# Patient Record
Sex: FEMALE | Race: WHITE | URBAN - METROPOLITAN AREA
[De-identification: names, ages, dates, MRNs, and addresses within clinical notes are randomized per-mention and may not be internally consistent; named-entity substitution may affect disease eponyms.]

---

## 2018-04-19 ENCOUNTER — HOSPITAL ENCOUNTER (OUTPATIENT)
Dept: OCCUPATIONAL THERAPY | Facility: HOSPITAL | Age: 23
End: 2018-04-21
Attending: SURGERY | Admitting: SURGERY

## 2018-04-19 LAB
APPEARANCE, UA: CLEAR
BACTERIA SPEC CULT: ABNORMAL /HPF
BILIRUB UR QL STRIP: NEGATIVE
COLOR UR: YELLOW
GLUCOSE (UA): NEGATIVE
HGB UR QL STRIP: NEGATIVE
KETONES UR QL STRIP: ABNORMAL
LEUKOCYTE ESTERASE UR QL STRIP: NEGATIVE
NITRITE UR QL STRIP: NEGATIVE
PH UR STRIP: 6 [PH] (ref 5–9)
PROT UR QL STRIP: NEGATIVE
RBC #/AREA URNS HPF: ABNORMAL /HPF
SP GR UR STRIP: >1.04 (ref 1–1.03)
SQUAMOUS EPITHELIAL, UA: ABNORMAL
UROBILINOGEN UR STRIP-ACNC: 1
WBC #/AREA URNS HPF: ABNORMAL /HPF

## 2018-04-20 LAB
ABS NEUT (OLG): 17.04 X10(3)/MCL (ref 2.1–9.2)
ALBUMIN SERPL-MCNC: 3 GM/DL (ref 3.4–5)
ALBUMIN/GLOB SERPL: 1 RATIO (ref 1.1–2)
ALP SERPL-CCNC: 59 UNIT/L (ref 38–126)
ALT SERPL-CCNC: 16 UNIT/L (ref 12–78)
AST SERPL-CCNC: 11 UNIT/L (ref 15–37)
BASOPHILS # BLD AUTO: 0 X10(3)/MCL (ref 0–0.2)
BASOPHILS NFR BLD AUTO: 0 %
BILIRUB SERPL-MCNC: 0.7 MG/DL (ref 0.2–1)
BILIRUBIN DIRECT+TOT PNL SERPL-MCNC: 0.2 MG/DL (ref 0–0.5)
BILIRUBIN DIRECT+TOT PNL SERPL-MCNC: 0.5 MG/DL (ref 0–0.8)
BUN SERPL-MCNC: 6 MG/DL (ref 7–18)
CALCIUM SERPL-MCNC: 7.7 MG/DL (ref 8.5–10.1)
CHLORIDE SERPL-SCNC: 106 MMOL/L (ref 98–107)
CO2 SERPL-SCNC: 24 MMOL/L (ref 21–32)
CREAT SERPL-MCNC: 0.53 MG/DL (ref 0.55–1.02)
ERYTHROCYTE [DISTWIDTH] IN BLOOD BY AUTOMATED COUNT: 11.9 % (ref 11.5–17)
GLOBULIN SER-MCNC: 2.9 GM/DL (ref 2.4–3.5)
GLUCOSE SERPL-MCNC: 90 MG/DL (ref 74–106)
HCT VFR BLD AUTO: 32.1 % (ref 37–47)
HGB BLD-MCNC: 10.9 GM/DL (ref 12–16)
LYMPHOCYTES # BLD AUTO: 1.7 X10(3)/MCL (ref 0.6–4.6)
LYMPHOCYTES NFR BLD AUTO: 9 %
MCH RBC QN AUTO: 31.4 PG (ref 27–31)
MCHC RBC AUTO-ENTMCNC: 34 GM/DL (ref 33–36)
MCV RBC AUTO: 92.5 FL (ref 80–94)
MONOCYTES # BLD AUTO: 0.7 X10(3)/MCL (ref 0.1–1.3)
MONOCYTES NFR BLD AUTO: 3 %
NEUTROPHILS # BLD AUTO: 17.04 X10(3)/MCL (ref 1.4–7.9)
NEUTROPHILS NFR BLD AUTO: 87 %
PLATELET # BLD AUTO: 183 X10(3)/MCL (ref 130–400)
PMV BLD AUTO: 9.9 FL (ref 9.4–12.4)
POTASSIUM SERPL-SCNC: 3.7 MMOL/L (ref 3.5–5.1)
PROT SERPL-MCNC: 5.9 GM/DL (ref 6.4–8.2)
RBC # BLD AUTO: 3.47 X10(6)/MCL (ref 4.2–5.4)
SODIUM SERPL-SCNC: 138 MMOL/L (ref 136–145)
WBC # SPEC AUTO: 19.5 X10(3)/MCL (ref 4.5–11.5)

## 2018-04-21 LAB
ABS NEUT (OLG): 12.72 X10(3)/MCL (ref 2.1–9.2)
BASOPHILS # BLD AUTO: 0 X10(3)/MCL (ref 0–0.2)
BASOPHILS NFR BLD AUTO: 0 %
EOSINOPHIL # BLD AUTO: 0 X10(3)/MCL (ref 0–0.9)
EOSINOPHIL NFR BLD AUTO: 0 %
ERYTHROCYTE [DISTWIDTH] IN BLOOD BY AUTOMATED COUNT: 12 % (ref 11.5–17)
HCT VFR BLD AUTO: 31.5 % (ref 37–47)
HGB BLD-MCNC: 10.7 GM/DL (ref 12–16)
LYMPHOCYTES # BLD AUTO: 0.8 X10(3)/MCL (ref 0.6–4.6)
LYMPHOCYTES NFR BLD AUTO: 6 %
MCH RBC QN AUTO: 30.4 PG (ref 27–31)
MCHC RBC AUTO-ENTMCNC: 34 GM/DL (ref 33–36)
MCV RBC AUTO: 89.5 FL (ref 80–94)
MONOCYTES # BLD AUTO: 0.8 X10(3)/MCL (ref 0.1–1.3)
MONOCYTES NFR BLD AUTO: 6 %
NEUTROPHILS # BLD AUTO: 12.72 X10(3)/MCL (ref 2.1–9.2)
NEUTROPHILS NFR BLD AUTO: 88 %
PLATELET # BLD AUTO: 207 X10(3)/MCL (ref 130–400)
PMV BLD AUTO: 9.9 FL (ref 9.4–12.4)
RBC # BLD AUTO: 3.52 X10(6)/MCL (ref 4.2–5.4)
WBC # SPEC AUTO: 14.5 X10(3)/MCL (ref 4.5–11.5)

## 2020-11-03 PROBLEM — S02.2XXA CLOSED FRACTURE OF NASAL BONES: Status: ACTIVE | Noted: 2020-11-03

## 2022-04-30 NOTE — H&P
Patient:   SHARON BREWER             MRN: [id number]            FIN: [id number]               Age:   22 years     Sex:  Female     :  1995   Associated Diagnoses:   None   Author:   Winston Villalpando      Basic Information   Admit information:  Abdominal pain and fever .    Source of history:  Self, Family member, Medical record.    Present at bedside:  Family member, Medical personnel.    Referral source:  Emergency department.    History limitation:  None.       Chief Complaint       2018 20:07 CDT      pt c/o R lower pelvic pain, seen at [facility] dx w/ uti today, states when she got home had vaginal bleeding, fever, and vomiting  x 1, lmp ended . had neg pregnancy test today.  in triage.    2018 13:57 CDT      pt reports to the ed c/o lower abdominal pain (stabbing/pressure) with chills since yesterday....        History of Present Illness   Very pleasant 22-year-old white female who presents to the emergency department after a 24-hour history of lower midline and right lower quadrant abdominal pain.  Seen earlier today at [facility] clinics diagnosed with urinary tract infection.  Post discharge the patient's fever spike to 103.1 and the patient began vomiting.  She returned to our emergency department.  An in and out catheter urinalysis revealed no urinary tract infection.  Subsequent CAT scan revealed fluid-filled loops of bowel which could represent gastroenteritis and an appendix that was not visualized.  The patient reports that she did begin having vaginal bleeding although her period ended .  The pain is worsened by palpation.  It is constant.  It is not relieved by anything.  She's been afebrile since arrival.  She has a leukocytosis of 16.6 otherwise her laboratory results are within normal limit.  She has no past surgical and medical history.  She has no known drug allergies.  She is currently comfortable although in some pain, with her  family at the bedside.  Hemodynamically she is stable.      Review of Systems   Constitutional:  Fever, Decreased activity.    Respiratory:  Negative.    Cardiovascular:  Negative.    Gastrointestinal:  Nausea, Vomiting, No diarrhea.         Abdominal pain: Right, Lower quadrant, Middle, The severity is moderate, Characterized as ( Continuous ).    Genitourinary:  Vaginal bleeding.    Hematology/Lymphatics:  Negative.    Endocrine:  Negative.    Immunologic:  Negative.    Musculoskeletal:  Negative.    Integumentary:  Negative.    Neurologic:  Negative.    Psychiatric:  Negative.       Health Status   Allergies:    Allergic Reactions (Selected)  No Known Allergies   Problem list:    All Problems  Tobacco user / SNOMED CT [id number] / Confirmed      Histories   Past Medical History: Negative   Procedure history: Negative   Social History        Social & Psychosocial Habits    Tobacco  04/19/2018  Use: Never smoker  .        Physical Examination   Vital Signs   4/19/2018 23:35 CDT      Peripheral Pulse Rate     119 bpm  HI                             Respiratory Rate          20 br/min                             SpO2                      98 %                             Oxygen Therapy            Room air                             Systolic Blood Pressure   121 mmHg                             Diastolic Blood Pressure  79 mmHg    4/19/2018 21:43 CDT      Peripheral Pulse Rate     125 bpm  HI                             Respiratory Rate          18 br/min                             SpO2                      99 %                             Oxygen Therapy            Room air                             Systolic Blood Pressure   124 mmHg                             Diastolic Blood Pressure  82 mmHg                             Mean Arterial Pressure, Cuff              96 mmHg    4/19/2018 20:07 CDT      Temperature Oral          37.3 DegC                             Temperature Oral (calculated)             99.14 DegF                              Peripheral Pulse Rate     143 bpm  HI                             Respiratory Rate          20 br/min                             SpO2                      96 %                             Oxygen Therapy            Room air                             Systolic Blood Pressure   136 mmHg                             Diastolic Blood Pressure  99 mmHg  HI    4/19/2018 16:36 CDT      Peripheral Pulse Rate     100 bpm                             Systolic Blood Pressure   110 mmHg                             Diastolic Blood Pressure  70 mmHg                             Mean Arterial Pressure, Cuff              83 mmHg    4/19/2018 13:57 CDT      Temperature Oral          36.9 DegC                             Temperature Oral (calculated)             98.42 DegF                             Peripheral Pulse Rate     128 bpm  HI                             Respiratory Rate          18 br/min                             SpO2                      100 %                             Oxygen Therapy            Room air                             Systolic Blood Pressure   123 mmHg                             Diastolic Blood Pressure  78 mmHg     Measurements from flowsheet : Measurements   4/19/2018 20:07 CDT      Weight Dosing             59 kg                             Weight Measured and Calculated in Lbs     130.07 lb                             Weight Estimated          59 kg                             Height/Length Dosing      152 cm                             Height/Length Estimated   152 cm                             Body Mass Index Estimated 25.54 kg/m2    4/19/2018 13:57 CDT      Weight Dosing             59.05 kg                             Weight Measured           59.05 kg                             Weight Measured and Calculated in Lbs     130.18 lb                             Height/Length Dosing      152.40 cm                             Height/Length Measured    152.40 cm                              Body Mass Index Measured  25.42 kg/m2        Review / Management   Results review:     No qualifying data available.    Laboratory Results   Today's Lab Results : PowerNote Discrete Results   4/19/2018 22:12 CDT      UA Appear                 CLEAR                             UA Color                  YELLOW                             UA Spec Grav              >1.040  HI                             UA Bili                   Negative                             UA pH                     6.0                             UA Urobilinogen           1.0                             UA Blood                  Negative                             UA Glucose                Negative                             UA Ketones                3+                             UA Protein                Negative                             UA Nitrite                Negative                             UA Leuk Est               Negative                             UA WBC                    0-2 /HPF                             UA RBC                    0-2 /HPF                             UA Bacteria               None Seen /HPF                             UA Squam Epithelial       None Seen    4/19/2018 14:29 CDT      WBC                       16.6 x10(3)/mcL  HI                             RBC                       4.40 x10(6)/mcL                             Hgb                       13.4 gm/dL                             Hct                       39.1 %                             Platelet                  278 x10(3)/mcL                             MCV                       88.9 fL                             MCH                       30.5 pg                             MCHC                      34.3 gm/dL                             RDW                       11.8 %                             MPV                       9.8 fL                             Abs Neut                  14.38 x10(3)/mcL  HI                              Segs Man                  83 %  HI                             Band Man                  5 %                             Lymph Man                 8.0 %  LOW                             Monocyte Man              3 %                             Eos Man                   0 %                             Basophil Man              0 %                             Abn Lymph Man             1 %  HI                             Platelet Est              Adequate                             RBC Morph                 Normal                             Sodium Lvl                138 mmol/L                             Potassium Lvl             3.8 mmol/L                             Chloride                  102 mmol/L                             CO2                       26 mmol/L                             Calcium Lvl               9.2 mg/dL                             Glucose Lvl               89 mg/dL                             BUN                       12 mg/dL                             Creatinine                0.70 mg/dL                             eGFR-AA                   >105 mL/min                             eGFR-RUPERT                  >105 mL/min                             Bili Total                0.4 mg/dL                             Bili Direct               0.1 mg/dL                             Bili Indirect             0.3 mg/dL                             AST                       17 unit/L                             ALT                       22 unit/L                             Alk Phos                  78 unit/L                             Total Protein             8.2 gm/dL                             Albumin Lvl               4.1 gm/dL                             Globulin                  4.10 gm/mL  HI                             A/G Ratio                 1 ratio    4/19/2018 14:13 CDT      UA Appear                 Hazy                             UA Color                  YELLOW                              UA Spec Grav              1.016                             UA Bili                   Negative                             UA pH                     6.5                             UA Urobilinogen           2 mg/dL                             UA Blood                  Negative                             UA Glucose                Normal                             UA Ketones                Trace                             UA Protein                Negative                             UA Nitrite                Negative                             UA Leuk Est               500 Ambrosio/uL                             UA WBC Interp             >=100 /HPF                             UA RBC Interp             0-2                             UA Bact Interp            Few                             UA Squam Epi Interp                                    UA Hyal Cast Interp       26-50           Impression and Plan   Vaginal bleeding  Leukocytosis  Fever  Lower midline or right lower quadrant abdominal pain  Vomiting    Admit to surgical hospitalist  Nothing by mouth  Zosyn  IV fluids  Trend labs and exam  Differential diagnosis at this point is gastroenteritis versus acute appendicitis.  Initial specimen of urine from outside hospital could have represented a urinary tract infection or a contaminated specimen.  Given the sterile nature of the repeat UA without evidence for urinary tract infection it is safe to say the patient does not have a urinary tract infection.  This constellation of symptoms and physical exam findings could represent gastroenteritis although we're unable to definitively diagnose or rule out appendicitis at this time.  Patient understands the differential diagnosis and agrees with the plan of care.  This has been discussed with Dr. Bassem Hickey.  The vaginal bleeding and comment of ovarian cyst on the CAT scan is of questionable significance.  The patient had a negative pregnancy test earlier  today in the emergency department at [facility].  We will discuss this further with the attending staff in the morning.

## 2022-04-30 NOTE — ED PROVIDER NOTES
Patient:   SHARON BREWER             MRN: [id number]            FIN: [id number]               Age:   22 years     Sex:  Female     :  1995   Associated Diagnoses:   Lower abdominal pain   Author:   Antonio Castro      Basic Information   Time seen: Date & time 2018 20:07:00.   History source: Patient.   Arrival mode: Private vehicle, wheelchair.   History limitation: None.   Additional information: Chief Complaint from Nursing Triage Note : Chief Complaint   2018 13:57 CDT      Chief Complaint           pt reports to the ed c/o lower abdominal pain (stabbing/pressure) with chills since yesterday....  .      History of Present Illness   The patient presents with Patient complaining of lower abdominal pain with fever and chills onset yesterday.  Seen at [facility] earlier today and had labs done but left prior to CT.  Patient reports worsening pain with fever. Reports nausea and vomiting. Reports vaginal bleeding as well. SERGIO Luis in sort.  The onset was 1  days ago.  The course/duration of symptoms is constant and fluctuating in intensity.  The character of symptoms is sharp.  The degree at onset was moderate.  The Location of pain at onset was lower and abdominal.  The degree at present is moderate.  The Location of pain at present is lower and abdominal.  Radiating pain: none. The exacerbating factor is none.  The relieving factor is none.  Therapy today: none.  Risk factors consist of none.  Associated symptoms: nausea, vomiting and fever.        Review of Systems   Constitutional symptoms:  Fever, No chills,    Respiratory symptoms:  No shortness of breath, no cough.    Cardiovascular symptoms:  No chest pain, no palpitations.    Gastrointestinal symptoms:  Abdominal pain, nausea, vomiting, No diarrhea,    Musculoskeletal symptoms:  No back pain,    Neurologic symptoms:  No altered level of consciousness, no weakness.              Additional review of systems information:  All other systems reviewed and otherwise negative.      Health Status   Allergies:    Allergic Reactions (Selected)  No Known Allergies.   Medications:  (Selected)   Prescriptions  Prescribed  Macrobid 100 mg oral capsule: 100 mg = 1 cap(s), Oral, BID, X 7 day(s), # 14 cap(s), 0 Refill(s), per nurse's notes.   Immunizations: Per nurse's notes.   Menstrual history: Per nurse's notes.      Past Medical/ Family/ Social History   Medical history:    No active or resolved past medical history items have been selected or recorded., Reviewed as documented in chart.   Surgical history:    pt. denies., Reviewed as documented in chart.   Family history:    No family history items have been selected or recorded., Reviewed as documented in chart.   Social history:    Social & Psychosocial Habits    Tobacco  04/19/2018  Use: Smoker, current status un  , Reviewed as documented in chart.   Problem list: Per nurse's notes.      Physical Examination               Vital Signs   Vital Signs   4/19/2018 16:36 CDT      Peripheral Pulse Rate     100 bpm                             Systolic Blood Pressure   110 mmHg                             Diastolic Blood Pressure  70 mmHg                             Mean Arterial Pressure, Cuff              83 mmHg    4/19/2018 13:57 CDT      Temperature Oral          36.9 DegC                             Temperature Oral (calculated)             98.42 DegF                             Peripheral Pulse Rate     128 bpm  HI                             Respiratory Rate          18 br/min                             SpO2                      100 %                             Oxygen Therapy            Room air                             Systolic Blood Pressure   123 mmHg                             Diastolic Blood Pressure  78 mmHg  .   Measurements   4/19/2018 13:57 CDT      Weight Dosing             59.05 kg                             Weight Measured           59.05 kg                              Weight Measured and Calculated in Lbs     130.18 lb                             Height/Length Dosing      152.40 cm                             Height/Length Measured    152.40 cm                             Body Mass Index Measured  25.42 kg/m2  .   General:  Alert, no acute distress, well hydrated, Skin: Normal for ethnicity.    Skin:  Warm, dry, pink, intact.    Head:  Normocephalic.   Neck:  Supple, no tenderness, full range of motion.    Eye:  Pupils are equal, round and reactive to light, extraocular movements are intact, normal conjunctiva.    Ears, nose, mouth and throat:  Oral mucosa moist.   Cardiovascular:  No murmur, Normal peripheral perfusion, Tachycardia, S1, S2.    Respiratory:  Lungs are clear to auscultation, breath sounds are equal, Respirations: not tachypneic, not labored, not shallow, Retractions: None.    Chest wall:  No tenderness.   Back:  Normal range of motion, Normal alignment, No costovertebral angle tenderness,    Musculoskeletal:  Normal ROM, normal strength, no swelling, no deformity.    Gastrointestinal:  Soft, Non distended, Tenderness: Moderate, suprapubic, Guarding: Negative, Rebound: Negative, Bowel sounds: Normal, Organomegaly: Negative, Trauma: Negative, Mass: Negative, Scars: Negative, Signs: None.    Neurological:  Alert and oriented to person, place, time, and situation, No focal neurological deficit observed, normal sensory observed, normal motor observed, normal speech observed, normal coordination observed, Gait: Normal.    Psychiatric:  Cooperative, appropriate mood & affect, normal judgment.       Medical Decision Making   Documents reviewed:  Emergency department nurses' notes, emergency department records, prior records.    Orders  Launch Orders   Pharmacy:  Normal Saline (0.9% NS) IV 1,000 mL (Order): 1,000 mL, 1,000 mL, IV, 1,000 mL/hr, start date 4/19/2018 20:08 CDT, stop date 4/19/2018 20:08 CDT, Launch Orders   Laboratory:  Urinalysis Complete a reflex to culture  (Order): Stat collect, Urine, 4/19/2018 20:45 CDT, Nurse collect, Print Label By Order Location  CBC - includes Diff (Order): Stat collect, 4/19/2018 20:45 CDT, Blood, Lab Collect, Print Label By Order Location, 4/19/2018 20:45 CDT  Radiology:  CT Abdomen and Pelvis W Contrast (Order): Stat, 4/19/2018 20:44 CDT, Abdominal Pain, None, Stretcher, Patient Has IV?, Rad Type, Schedule this test, [facility]  , Launch Orders   Laboratory:  CBC - includes Diff (Discontinue): 4/19/2018 20:46 CDT  Urinalysis Complete a reflex to culture (Discontinue): 4/19/2018 20:46 CDT  , Launch Orders   Pharmacy:  Cipro (Order): 400 mg, form: Infusion, IV Piggyback, Once, first dose 4/19/2018 21:40 CDT, stop date 4/19/2018 21:40 CDT  NS (0.9% Sodium Chloride) Infusion 1000 mL (Order): 1,000 mL, 1,000 mL, IV, 1,000 mL/hr, start date 4/19/2018 21:40 CDT  , Launch Orders   Laboratory:  Urinalysis Complete a reflex to culture (Order): Stat collect, Urine, 4/19/2018 21:46 CDT, Nurse collect, Print Label By Order Location  , Launch Orders   Pharmacy:  Dilaudid (Order): 1 mg, form: Injection, IV, Once, first dose 4/19/2018 21:50 CDT, stop date 4/19/2018 21:50 CDT  Zofran 2 mg/mL injectable solution (Order): 4 mg, form: Injection, IV, Once, first dose 4/19/2018 21:50 CDT, stop date 4/19/2018 21:50 CDT  , Launch Orders   Pharmacy:  morphine 4 mg/mL preservative-free intravenous solution (Order): 4 mg, IV Push, Once, first dose 4/19/2018 21:51 CDT, stop date 4/19/2018 21:51 CDT, ( > 7 on pain scale)  Dilaudid (Discontinue): 4/19/2018 21:51 CDT  .    Results review:  Lab results : Lab View   4/19/2018 22:12 CDT      UA Appear                 CLEAR                             UA Color                  YELLOW                             UA Spec Grav              >1.040  HI                             UA Bili                   Negative                             UA pH                     6.0                             UA Urobilinogen            1.0                             UA Blood                  Negative                             UA Glucose                Negative                             UA Ketones                3+                             UA Protein                Negative                             UA Nitrite                Negative                             UA Leuk Est               Negative                             UA WBC                    0-2 /HPF                             UA RBC                    0-2 /HPF                             UA Bacteria               None Seen /HPF                             UA Squam Epithelial       None Seen    4/19/2018 14:29 CDT      Sodium Lvl                138 mmol/L                             Potassium Lvl             3.8 mmol/L                             Chloride                  102 mmol/L                             CO2                       26 mmol/L                             Calcium Lvl               9.2 mg/dL                             Glucose Lvl               89 mg/dL                             BUN                       12 mg/dL                             Creatinine                0.70 mg/dL                             eGFR-AA                   >105 mL/min                             eGFR-RUPERT                  >105 mL/min                             Bili Total                0.4 mg/dL                             Bili Direct               0.1 mg/dL                             Bili Indirect             0.3 mg/dL                             AST                       17 unit/L                             ALT                       22 unit/L                             Alk Phos                  78 unit/L                             Total Protein             8.2 gm/dL                             Albumin Lvl               4.1 gm/dL                             Globulin                  4.10 gm/mL  HI                             A/G Ratio                 1 ratio                              WBC                       16.6 x10(3)/mcL  HI                             RBC                       4.40 x10(6)/mcL                             Hgb                       13.4 gm/dL                             Hct                       39.1 %                             Platelet                  278 x10(3)/mcL                             MCV                       88.9 fL                             MCH                       30.5 pg                             MCHC                      34.3 gm/dL                             RDW                       11.8 %                             MPV                       9.8 fL                             Abs Neut                  14.38 x10(3)/mcL  HI                             Segs Man                  83 %  HI                             Band Man                  5 %                             Lymph Man                 8.0 %  LOW                             Monocyte Man              3 %                             Eos Man                   0 %                             Basophil Man              0 %                             Abn Lymph Man             1 %  HI                             Platelet Est              Adequate                             RBC Morph                 Normal    4/19/2018 14:13 CDT      UA Appear                 Hazy                             UA Color                  YELLOW                             UA Spec Grav              1.016                             UA Bili                   Negative                             UA pH                     6.5                             UA Urobilinogen           2 mg/dL                             UA Blood                  Negative                             UA Glucose                Normal                             UA Ketones                Trace                             UA Protein                Negative                             UA Nitrite                Negative                             UA  Leuk Est               500 Ambrosio/uL                             UA WBC Interp             >=100 /HPF                             UA RBC Interp             0-2                             UA Bact Interp            Few                             UA Squam Epi Interp                                    UA Hyal Cast Interp       26-50                             U beta hCG Ql POC         Negative    .   Radiology results:  Rad Results (ST)  < 12 hrs   Accession: [id number]  Order: CT Abdomen and Pelvis W Contrast  Report Dt/Tm: 04/19/2018 21:09  Report:   CT of the abdomen and pelvis without oral after IV contrast only     Clinical history is abdominal pain     The lung bases appear clear. Liver appears normal. Spleen appears  normal. Pancreas appears normal. Biliary system appears normal. The  adrenals are not enlarged. Aorta shows no evidence of an aneurysm.  Kidneys appear normal. Uterus appears normal. There appears to be  small cyst of either ovary the appendix is not identified. There are  fluid-filled loops of small bowel however they are not distended this  can be seen in a gastroenteritis. No free air is noted.     IMPRESSION: Nonidentification of the appendix.     Fluid-filled loops of small bowel cannot rule out a gastroenteritis.        .      Impression and Plan   Diagnosis   Lower abdominal pain ([id number] R10.30)      Calls-Consults   -  4/19/2018 23:09:00 , Ruperto CHEN, Bassem Bean, richard Montero came to see and will admit.    Plan   Condition: Stable.    Disposition: Admit time  4/19/2018 23:36:00, Place in Observation Unit.    Counseled: Patient, Regarding diagnosis, Regarding diagnostic results, Regarding treatment plan, Patient indicated understanding of instructions.       Addendum      Teaching-Supervisory Addendum-Brief   I participated in the following activities of this patients care: the medical history, the physical exam, medical decision making.   I personally performed: supervision of  the patient's care, the medical history, the medical decision making.   The case was discussed with: the nurse practitioner.   Evaluation and management service: I agree with the evaluation and management decisions made in this patient's care.   Results interpretation: I agree with the study interpretation in this patient's care.   Notes: I conducted my own face-to-face evaluation of the patient and performed an independent history and physical examination of this patient. I discussed the MDM and reviewed the results with the NP.    Briefly, this is an otherwise healthy 23 y/o F who presented to the ED c/o 1 day worsening RLQ abd pain, fever, N/V today. She also reported some mild vaginal spotting.  She was seen at [facility] and diagnosed with a UTI, but pain worsening. On records review, UA appears contaminated, likely with vaginal secretions/blood. Work up here, including cath UA, w/ leukocytosis, no clear UTI. CT scan obtained given significant RLQ tenderness, appendix not visualized, small ovarian cysts noted, also note made of fluid filled, nondistended loops of bowel. Given nonvisualization of the appendix, fever, N/V and tenderness and leukocytosis, surgical hospitalist consulted for evaulation. NP examined the patient at the bedside and recommended admission for serial abdominal examinations and monitoring for clinical improvement/deterioration. Findings and plan discussed with the patient's family, and they are agreeable to admission at this time.     Katya Cohn MD.

## 2022-05-04 NOTE — HISTORICAL [facility] CERNER
This is a historical note converted from Lauren. Formatting and pictures may have been removed.  Please reference Lauren for original formatting and attached multimedia. Admission Information  22 year old female admitted for abdominal pain, fever, and leukocytosis to rule out appendicitis  Hospital Course  Admitted to hospital on 4/20/18 for rule out appendicitis. Patient had significant improvement in her symptoms by HD #1. She had resolution of her abdominal pain. No nausea/vomiting and was hungry. No fevers while admitted and WBC count had trended down. Patient felt well and was ready to go home.  Physical Exam  Vitals & Measurements  T:?36.8? ?C (Oral)? TMIN:?36.4? ?C (Oral)? TMAX:?37.2? ?C (Oral)? HR:?66(Peripheral)? RR:?18? BP:?116/78? SpO2:?97%?  A&Ox3, NAD  Abd soft, NT/ND  Discharge Plan  Patient and family instructed for her to follow up with OB/GYN. Will send home on 5 day course of Augmentin to treat any possible source of fever and leukocytosis.  Abdominal pain  Lower abdominal pain  Orders:  acetaminophen, 650 mg, form: Tab, Oral, q4hr PRN for pain, first dose 04/20/18 20:41:00 CDT  amoxicillin-clavulanate, = 1 tab(s), Oral, q12hr, X 5 day(s), # 10 tab(s), 0 Refill(s)  promethazine, 12.5 mg, form: Injection, IV Push, q6hr PRN for nausea, first dose 04/21/18 3:37:00 CDT  Discharge, Home, Give all scheduled vaccinations prior to discharge.  Discharge Activity, Activity as Tolerated  Discharge Diet, Regular  MD to Nurse, 04/20/18 20:42:00 CDT, may use heating pad for abd pain  MD to Nurse, 04/21/18 11:16:00 CDT, Give all scheduled vaccinations prior to discharge.  Peripheral IV Discontinue, 04/21/18 11:16:00 CDT  Patient Discharge Condition  Stable  Discharge Disposition  Home   I agree with resident documentation. I was physically present, supervised resident, ?and discussed plan of care.